# Patient Record
Sex: FEMALE | Race: OTHER | NOT HISPANIC OR LATINO
[De-identification: names, ages, dates, MRNs, and addresses within clinical notes are randomized per-mention and may not be internally consistent; named-entity substitution may affect disease eponyms.]

---

## 2017-03-06 ENCOUNTER — TRANSCRIPTION ENCOUNTER (OUTPATIENT)
Age: 22
End: 2017-03-06

## 2018-10-11 NOTE — CONSULT NOTE ADULT - SUBJECTIVE AND OBJECTIVE BOX
HPI: Patient is a 24 y/o F who presents c/o R foot and ankle pain and swelling s/p stepping into pothole and falling down yesterday.     PAST MEDICAL & SURGICAL HISTORY:  Pyelonephritis  Urinary tract infection  No significant past surgical history    Review of systems: Non Contributory    MEDICATIONS  (STANDING):    Allergies: No known Allergies    Vital Signs Last 24 Hrs  T(C): 36.8 (18 Oct 2018 17:36), Max: 36.8 (18 Oct 2018 17:36)  T(F): 98.2 (18 Oct 2018 17:36), Max: 98.2 (18 Oct 2018 17:36)  HR: 79 (18 Oct 2018 17:36) (79 - 79)  BP: 117/76 (18 Oct 2018 17:36) (117/76 - 117/76)  BP(mean): --  RR: 18 (18 Oct 2018 17:36) (18 - 18)  SpO2: 99% (18 Oct 2018 17:36) (99% - 99%)    Physical Examination:    Musculoskeletal:  Physical examination of right foot and ankle shows pain to palpation of the base of 5th metatarsal area. There is moderate swelling, ecchymosis and decreased in range of motion noted.     Neurovascularly Intact    RADIOLOGY & ADDITIONAL STUDIES: X-ray of right foot and ankle done in the ER shows a mildly displaced 5th metatarsal base fracture    ASSESSMENT: Right 5th metatarsal base fracture, s/p fall in Pothole.     PLAN/RECOMMENDATION: Closed treatment of fracture and application of short leg walking cast.  Patient tolerated well.     Rest. Elevation. Take anti-inflammatory medication.     FOLLOW UP: With office in 1-2 weeks.

## 2018-10-18 ENCOUNTER — EMERGENCY (EMERGENCY)
Facility: HOSPITAL | Age: 23
LOS: 1 days | Discharge: HOME | End: 2018-10-18
Attending: EMERGENCY MEDICINE
Payer: SELF-PAY

## 2018-10-18 VITALS
SYSTOLIC BLOOD PRESSURE: 117 MMHG | TEMPERATURE: 98 F | RESPIRATION RATE: 18 BRPM | OXYGEN SATURATION: 99 % | HEART RATE: 79 BPM | DIASTOLIC BLOOD PRESSURE: 76 MMHG

## 2018-10-18 VITALS — HEIGHT: 61 IN | WEIGHT: 136.03 LBS

## 2018-10-18 PROCEDURE — 29425 APPL SHORT LEG CAST WALKING: CPT | Mod: RT

## 2018-10-18 PROCEDURE — 99284 EMERGENCY DEPT VISIT MOD MDM: CPT | Mod: 25

## 2018-10-18 PROCEDURE — 99284 EMERGENCY DEPT VISIT MOD MDM: CPT

## 2018-10-18 PROCEDURE — 73630 X-RAY EXAM OF FOOT: CPT | Mod: 26,RT

## 2018-10-18 PROCEDURE — 73630 X-RAY EXAM OF FOOT: CPT

## 2018-10-18 NOTE — ED ADULT NURSE NOTE - OBJECTIVE STATEMENT
AOX3 difficulty ambulating on the right foot. As per patient she fell on a pothole x last night. Denies any LOC

## 2018-10-18 NOTE — ED PROVIDER NOTE - MEDICAL DECISION MAKING DETAILS
Pt w/ R foot pain and swelling s/p fall. No deformity. Will get X-ray and give pain control and reassess.

## 2018-10-18 NOTE — ED PROVIDER NOTE - PHYSICAL EXAMINATION
MSK: swelling to the lateral aspect of the R foot, some discoloration. No ankle tenderness. Otherwise wnl.

## 2018-10-18 NOTE — ED PROVIDER NOTE - OBJECTIVE STATEMENT
22 y/o F pt w/ hx of pyelonephritis, UTI presents c/o R foot pain and swelling s/p stepping into pothole and falling down x 10 PM yesterday. Pt relates worsening pain. Denies LOC, head trauma and any other complaints. NKDA.

## 2020-07-28 ENCOUNTER — RESULT REVIEW (OUTPATIENT)
Age: 25
End: 2020-07-28

## 2020-07-28 PROBLEM — Z00.00 ENCOUNTER FOR PREVENTIVE HEALTH EXAMINATION: Status: ACTIVE | Noted: 2020-07-28

## 2020-07-29 ENCOUNTER — NON-APPOINTMENT (OUTPATIENT)
Age: 25
End: 2020-07-29

## 2020-07-29 ENCOUNTER — APPOINTMENT (OUTPATIENT)
Dept: OBGYN | Facility: CLINIC | Age: 25
End: 2020-07-29
Payer: MEDICAID

## 2020-07-29 ENCOUNTER — OUTPATIENT (OUTPATIENT)
Dept: OUTPATIENT SERVICES | Facility: HOSPITAL | Age: 25
LOS: 1 days | End: 2020-07-29
Payer: MEDICAID

## 2020-07-29 VITALS — TEMPERATURE: 99.3 F | OXYGEN SATURATION: 98 % | RESPIRATION RATE: 16 BRPM

## 2020-07-29 VITALS
BODY MASS INDEX: 28.7 KG/M2 | SYSTOLIC BLOOD PRESSURE: 126 MMHG | HEIGHT: 61.5 IN | WEIGHT: 154 LBS | DIASTOLIC BLOOD PRESSURE: 72 MMHG

## 2020-07-29 DIAGNOSIS — Z78.9 OTHER SPECIFIED HEALTH STATUS: ICD-10-CM

## 2020-07-29 DIAGNOSIS — Z34.01 ENCOUNTER FOR SUPERVISION OF NORMAL FIRST PREGNANCY, FIRST TRIMESTER: ICD-10-CM

## 2020-07-29 DIAGNOSIS — Z86.59 PERSONAL HISTORY OF OTHER MENTAL AND BEHAVIORAL DISORDERS: ICD-10-CM

## 2020-07-29 DIAGNOSIS — Z34.00 ENCOUNTER FOR SUPERVISION OF NORMAL FIRST PREGNANCY, UNSPECIFIED TRIMESTER: ICD-10-CM

## 2020-07-29 DIAGNOSIS — Z82.49 FAMILY HISTORY OF ISCHEMIC HEART DISEASE AND OTHER DISEASES OF THE CIRCULATORY SYSTEM: ICD-10-CM

## 2020-07-29 DIAGNOSIS — Z80.0 FAMILY HISTORY OF MALIGNANT NEOPLASM OF DIGESTIVE ORGANS: ICD-10-CM

## 2020-07-29 DIAGNOSIS — Z83.79 FAMILY HISTORY OF OTHER DISEASES OF THE DIGESTIVE SYSTEM: ICD-10-CM

## 2020-07-29 DIAGNOSIS — Z80.3 FAMILY HISTORY OF MALIGNANT NEOPLASM OF BREAST: ICD-10-CM

## 2020-07-29 DIAGNOSIS — Z87.891 PERSONAL HISTORY OF NICOTINE DEPENDENCE: ICD-10-CM

## 2020-07-29 DIAGNOSIS — Z3A.01 LESS THAN 8 WEEKS GESTATION OF PREGNANCY: ICD-10-CM

## 2020-07-29 PROCEDURE — 83020 HEMOGLOBIN ELECTROPHORESIS: CPT | Mod: 26

## 2020-07-29 PROCEDURE — 36415 COLL VENOUS BLD VENIPUNCTURE: CPT | Mod: NC

## 2020-07-29 PROCEDURE — 88141 CYTOPATH C/V INTERPRET: CPT

## 2020-07-29 PROCEDURE — 99203 OFFICE O/P NEW LOW 30 MIN: CPT | Mod: NC,25

## 2020-07-30 DIAGNOSIS — Z86.59 PERSONAL HISTORY OF OTHER MENTAL AND BEHAVIORAL DISORDERS: ICD-10-CM

## 2020-07-30 DIAGNOSIS — Z34.01 ENCOUNTER FOR SUPERVISION OF NORMAL FIRST PREGNANCY, FIRST TRIMESTER: ICD-10-CM

## 2020-07-30 DIAGNOSIS — Z3A.01 LESS THAN 8 WEEKS GESTATION OF PREGNANCY: ICD-10-CM

## 2020-07-30 DIAGNOSIS — N89.8 OTHER SPECIFIED NONINFLAMMATORY DISORDERS OF VAGINA: ICD-10-CM

## 2020-07-30 LAB
A1C WITH ESTIMATED AVERAGE GLUCOSE RESULT: 5 % — SIGNIFICANT CHANGE UP (ref 4–5.6)
APPEARANCE UR: CLEAR — SIGNIFICANT CHANGE UP
BACTERIA # UR AUTO: NEGATIVE — SIGNIFICANT CHANGE UP
BASOPHILS # BLD AUTO: 0.04 K/UL — SIGNIFICANT CHANGE UP (ref 0–0.2)
BASOPHILS NFR BLD AUTO: 0.4 % — SIGNIFICANT CHANGE UP (ref 0–2)
BILIRUB UR-MCNC: NEGATIVE — SIGNIFICANT CHANGE UP
C TRACH RRNA SPEC QL NAA+PROBE: SIGNIFICANT CHANGE UP
C TRACH+GC RRNA SPEC QL PROBE: SIGNIFICANT CHANGE UP
CANDIDA AB TITR SER: SIGNIFICANT CHANGE UP
COLOR SPEC: SIGNIFICANT CHANGE UP
DIFF PNL FLD: NEGATIVE — SIGNIFICANT CHANGE UP
EOSINOPHIL # BLD AUTO: 0.08 K/UL — SIGNIFICANT CHANGE UP (ref 0–0.5)
EOSINOPHIL NFR BLD AUTO: 0.8 % — SIGNIFICANT CHANGE UP (ref 0–6)
EPI CELLS # UR: 6 /HPF — HIGH (ref 0–5)
ESTIMATED AVERAGE GLUCOSE: 97 MG/DL — SIGNIFICANT CHANGE UP (ref 68–114)
G VAGINALIS DNA SPEC QL NAA+PROBE: DETECTED
GLUCOSE UR QL: NEGATIVE — SIGNIFICANT CHANGE UP
HBV SURFACE AG SER-ACNC: SIGNIFICANT CHANGE UP
HBV SURFACE AG SER-ACNC: SIGNIFICANT CHANGE UP
HCT VFR BLD CALC: 42.2 % — SIGNIFICANT CHANGE UP (ref 34.5–45)
HCV AB S/CO SERPL IA: 0.12 S/CO — SIGNIFICANT CHANGE UP (ref 0–0.99)
HCV AB SERPL-IMP: SIGNIFICANT CHANGE UP
HGB BLD-MCNC: 13 G/DL — SIGNIFICANT CHANGE UP (ref 11.5–15.5)
HIV 1+2 AB+HIV1 P24 AG SERPL QL IA: SIGNIFICANT CHANGE UP
HPV HIGH+LOW RISK DNA PNL CVX: DETECTED
HYALINE CASTS # UR AUTO: 3 /LPF — SIGNIFICANT CHANGE UP (ref 0–7)
IMM GRANULOCYTES NFR BLD AUTO: 0.3 % — SIGNIFICANT CHANGE UP (ref 0–1.5)
KETONES UR-MCNC: NEGATIVE — SIGNIFICANT CHANGE UP
LEUKOCYTE ESTERASE UR-ACNC: ABNORMAL
LYMPHOCYTES # BLD AUTO: 2.07 K/UL — SIGNIFICANT CHANGE UP (ref 1–3.3)
LYMPHOCYTES # BLD AUTO: 21.6 % — SIGNIFICANT CHANGE UP (ref 13–44)
MCHC RBC-ENTMCNC: 27.8 PG — SIGNIFICANT CHANGE UP (ref 27–34)
MCHC RBC-ENTMCNC: 30.8 GM/DL — LOW (ref 32–36)
MCV RBC AUTO: 90.2 FL — SIGNIFICANT CHANGE UP (ref 80–100)
MEV IGG SER-ACNC: >300 AU/ML — SIGNIFICANT CHANGE UP
MEV IGG+IGM SER-IMP: POSITIVE — SIGNIFICANT CHANGE UP
MONOCYTES # BLD AUTO: 0.79 K/UL — SIGNIFICANT CHANGE UP (ref 0–0.9)
MONOCYTES NFR BLD AUTO: 8.2 % — SIGNIFICANT CHANGE UP (ref 2–14)
N GONORRHOEA RRNA SPEC QL NAA+PROBE: SIGNIFICANT CHANGE UP
NEUTROPHILS # BLD AUTO: 6.59 K/UL — SIGNIFICANT CHANGE UP (ref 1.8–7.4)
NEUTROPHILS NFR BLD AUTO: 68.7 % — SIGNIFICANT CHANGE UP (ref 43–77)
NITRITE UR-MCNC: NEGATIVE — SIGNIFICANT CHANGE UP
PH UR: 7.5 — SIGNIFICANT CHANGE UP (ref 5–8)
PLATELET # BLD AUTO: 237 K/UL — SIGNIFICANT CHANGE UP (ref 150–400)
PROT UR-MCNC: NEGATIVE — SIGNIFICANT CHANGE UP
RBC # BLD: 4.68 M/UL — SIGNIFICANT CHANGE UP (ref 3.8–5.2)
RBC # FLD: 13.4 % — SIGNIFICANT CHANGE UP (ref 10.3–14.5)
RBC CASTS # UR COMP ASSIST: 1 /HPF — SIGNIFICANT CHANGE UP (ref 0–4)
RUBV IGG SER-ACNC: 8.7 INDEX — SIGNIFICANT CHANGE UP
RUBV IGG SER-IMP: POSITIVE — SIGNIFICANT CHANGE UP
SP GR SPEC: 1.01 — SIGNIFICANT CHANGE UP (ref 1.01–1.02)
T PALLIDUM AB TITR SER: NEGATIVE — SIGNIFICANT CHANGE UP
T VAGINALIS SPEC QL WET PREP: SIGNIFICANT CHANGE UP
UROBILINOGEN FLD QL: SIGNIFICANT CHANGE UP
VZV IGG FLD QL IA: 2268 INDEX — SIGNIFICANT CHANGE UP
VZV IGG FLD QL IA: POSITIVE — SIGNIFICANT CHANGE UP
WBC # BLD: 9.6 K/UL — SIGNIFICANT CHANGE UP (ref 3.8–10.5)
WBC # FLD AUTO: 9.6 K/UL — SIGNIFICANT CHANGE UP (ref 3.8–10.5)
WBC UR QL: 1 /HPF — SIGNIFICANT CHANGE UP (ref 0–5)

## 2020-07-30 PROCEDURE — 87491 CHLMYD TRACH DNA AMP PROBE: CPT

## 2020-07-30 PROCEDURE — 86901 BLOOD TYPING SEROLOGIC RH(D): CPT

## 2020-07-30 PROCEDURE — 87591 N.GONORRHOEAE DNA AMP PROB: CPT

## 2020-07-30 PROCEDURE — 83036 HEMOGLOBIN GLYCOSYLATED A1C: CPT

## 2020-07-30 PROCEDURE — 81220 CFTR GENE COM VARIANTS: CPT

## 2020-07-30 PROCEDURE — 87186 SC STD MICRODIL/AGAR DIL: CPT

## 2020-07-30 PROCEDURE — 83655 ASSAY OF LEAD: CPT

## 2020-07-30 PROCEDURE — 87340 HEPATITIS B SURFACE AG IA: CPT

## 2020-07-30 PROCEDURE — 87086 URINE CULTURE/COLONY COUNT: CPT

## 2020-07-30 PROCEDURE — 81001 URINALYSIS AUTO W/SCOPE: CPT

## 2020-07-30 PROCEDURE — 86780 TREPONEMA PALLIDUM: CPT

## 2020-07-30 PROCEDURE — 86850 RBC ANTIBODY SCREEN: CPT

## 2020-07-30 PROCEDURE — G0463: CPT

## 2020-07-30 PROCEDURE — 86900 BLOOD TYPING SEROLOGIC ABO: CPT

## 2020-07-30 PROCEDURE — 87624 HPV HI-RISK TYP POOLED RSLT: CPT

## 2020-07-30 PROCEDURE — 87389 HIV-1 AG W/HIV-1&-2 AB AG IA: CPT

## 2020-07-30 PROCEDURE — 36415 COLL VENOUS BLD VENIPUNCTURE: CPT

## 2020-07-30 PROCEDURE — 86803 HEPATITIS C AB TEST: CPT

## 2020-07-30 PROCEDURE — 86762 RUBELLA ANTIBODY: CPT

## 2020-07-30 PROCEDURE — 87800 DETECT AGNT MULT DNA DIREC: CPT

## 2020-07-30 PROCEDURE — 86480 TB TEST CELL IMMUN MEASURE: CPT

## 2020-07-30 PROCEDURE — 81003 URINALYSIS AUTO W/O SCOPE: CPT

## 2020-07-30 PROCEDURE — 88175 CYTOPATH C/V AUTO FLUID REDO: CPT

## 2020-07-30 PROCEDURE — 86787 VARICELLA-ZOSTER ANTIBODY: CPT

## 2020-07-30 PROCEDURE — 86765 RUBEOLA ANTIBODY: CPT

## 2020-07-30 PROCEDURE — 83020 HEMOGLOBIN ELECTROPHORESIS: CPT

## 2020-07-30 PROCEDURE — 87625 HPV TYPES 16 & 18 ONLY: CPT

## 2020-07-30 PROCEDURE — 81025 URINE PREGNANCY TEST: CPT

## 2020-07-31 DIAGNOSIS — N89.8 OTHER SPECIFIED NONINFLAMMATORY DISORDERS OF VAGINA: ICD-10-CM

## 2020-07-31 LAB
C TRACH RRNA SPEC QL NAA+PROBE: SIGNIFICANT CHANGE UP
GAMMA INTERFERON BACKGROUND BLD IA-ACNC: 0.02 IU/ML — SIGNIFICANT CHANGE UP
HEMOGLOBIN INTERPRETATION: SIGNIFICANT CHANGE UP
HGB A MFR BLD: 97.2 % — SIGNIFICANT CHANGE UP (ref 95.8–98)
HGB A2 MFR BLD: 2.8 % — SIGNIFICANT CHANGE UP (ref 2–3.2)
M TB IFN-G BLD-IMP: NEGATIVE — SIGNIFICANT CHANGE UP
M TB IFN-G CD4+ BCKGRND COR BLD-ACNC: 0 IU/ML — SIGNIFICANT CHANGE UP
M TB IFN-G CD4+CD8+ BCKGRND COR BLD-ACNC: 0 IU/ML — SIGNIFICANT CHANGE UP
N GONORRHOEA RRNA SPEC QL NAA+PROBE: SIGNIFICANT CHANGE UP
QUANT TB PLUS MITOGEN MINUS NIL: 9.2 IU/ML — SIGNIFICANT CHANGE UP
SPECIMEN SOURCE: SIGNIFICANT CHANGE UP

## 2020-07-31 RX ORDER — PRENATAL VIT NO.130/IRON/FOLIC 27MG-0.8MG
28-0.8 TABLET ORAL
Qty: 30 | Refills: 11 | Status: ACTIVE | COMMUNITY
Start: 2020-07-31 | End: 1900-01-01

## 2020-07-31 RX ORDER — PRENATAL VIT 49/IRON FUM/FOLIC 6.75-0.2MG
TABLET ORAL
Refills: 0 | Status: COMPLETED | COMMUNITY
End: 2020-07-31

## 2020-08-01 LAB
-  AMIKACIN: SIGNIFICANT CHANGE UP
-  AMOXICILLIN/CLAVULANIC ACID: SIGNIFICANT CHANGE UP
-  AMPICILLIN/SULBACTAM: SIGNIFICANT CHANGE UP
-  AMPICILLIN: SIGNIFICANT CHANGE UP
-  AZTREONAM: SIGNIFICANT CHANGE UP
-  CEFAZOLIN: SIGNIFICANT CHANGE UP
-  CEFEPIME: SIGNIFICANT CHANGE UP
-  CEFOXITIN: SIGNIFICANT CHANGE UP
-  CEFTRIAXONE: SIGNIFICANT CHANGE UP
-  CIPROFLOXACIN: SIGNIFICANT CHANGE UP
-  ERTAPENEM: SIGNIFICANT CHANGE UP
-  GENTAMICIN: SIGNIFICANT CHANGE UP
-  IMIPENEM: SIGNIFICANT CHANGE UP
-  LEVOFLOXACIN: SIGNIFICANT CHANGE UP
-  MEROPENEM: SIGNIFICANT CHANGE UP
-  NITROFURANTOIN: SIGNIFICANT CHANGE UP
-  PIPERACILLIN/TAZOBACTAM: SIGNIFICANT CHANGE UP
-  TIGECYCLINE: SIGNIFICANT CHANGE UP
-  TOBRAMYCIN: SIGNIFICANT CHANGE UP
-  TRIMETHOPRIM/SULFAMETHOXAZOLE: SIGNIFICANT CHANGE UP
CULTURE RESULTS: SIGNIFICANT CHANGE UP
HPV GENOTYPE 16: SIGNIFICANT CHANGE UP
HPV GENOTYPE 18/45: SIGNIFICANT CHANGE UP
LEAD BLD-MCNC: SIGNIFICANT CHANGE UP UG/DL (ref 0–4)
METHOD TYPE: SIGNIFICANT CHANGE UP
ORGANISM # SPEC MICROSCOPIC CNT: SIGNIFICANT CHANGE UP
ORGANISM # SPEC MICROSCOPIC CNT: SIGNIFICANT CHANGE UP
SPECIMEN SOURCE: SIGNIFICANT CHANGE UP

## 2020-08-03 DIAGNOSIS — N76.0 ACUTE VAGINITIS: ICD-10-CM

## 2020-08-03 DIAGNOSIS — N39.0 URINARY TRACT INFECTION, SITE NOT SPECIFIED: ICD-10-CM

## 2020-08-03 DIAGNOSIS — B96.89 ACUTE VAGINITIS: ICD-10-CM

## 2020-08-03 DIAGNOSIS — A49.9 URINARY TRACT INFECTION, SITE NOT SPECIFIED: ICD-10-CM

## 2020-08-03 RX ORDER — NITROFURANTOIN MACROCRYSTALS 100 MG/1
100 CAPSULE ORAL
Qty: 14 | Refills: 0 | Status: ACTIVE | COMMUNITY
Start: 2020-08-03 | End: 1900-01-01

## 2020-08-03 RX ORDER — METRONIDAZOLE 500 MG/1
500 TABLET ORAL TWICE DAILY
Qty: 14 | Refills: 0 | Status: ACTIVE | COMMUNITY
Start: 2020-08-03 | End: 1900-01-01

## 2020-08-04 LAB — CYTOLOGY SPEC DOC CYTO: SIGNIFICANT CHANGE UP

## 2020-08-06 LAB — CYSTIC FIBROSIS EXPANDED PANEL: SIGNIFICANT CHANGE UP

## 2020-12-23 PROBLEM — N39.0 BACTERIAL UTI: Status: RESOLVED | Noted: 2020-08-03 | Resolved: 2020-12-23

## 2020-12-23 PROBLEM — N76.0 BACTERIAL VAGINOSIS: Status: RESOLVED | Noted: 2020-08-03 | Resolved: 2020-12-23

## 2023-12-28 ENCOUNTER — EMERGENCY (EMERGENCY)
Facility: HOSPITAL | Age: 28
LOS: 1 days | Discharge: HOME | End: 2023-12-28
Attending: EMERGENCY MEDICINE
Payer: MEDICAID

## 2023-12-28 VITALS
WEIGHT: 157.19 LBS | HEART RATE: 63 BPM | TEMPERATURE: 98 F | RESPIRATION RATE: 17 BRPM | SYSTOLIC BLOOD PRESSURE: 127 MMHG | OXYGEN SATURATION: 98 % | HEIGHT: 61 IN | DIASTOLIC BLOOD PRESSURE: 84 MMHG

## 2023-12-28 PROCEDURE — 70486 CT MAXILLOFACIAL W/O DYE: CPT | Mod: MA

## 2023-12-28 PROCEDURE — 99284 EMERGENCY DEPT VISIT MOD MDM: CPT | Mod: 25

## 2023-12-28 PROCEDURE — 70486 CT MAXILLOFACIAL W/O DYE: CPT | Mod: 26,MA

## 2023-12-28 PROCEDURE — 99284 EMERGENCY DEPT VISIT MOD MDM: CPT

## 2023-12-28 RX ORDER — IBUPROFEN 200 MG
600 TABLET ORAL ONCE
Refills: 0 | Status: COMPLETED | OUTPATIENT
Start: 2023-12-28 | End: 2023-12-28

## 2023-12-28 RX ADMIN — Medication 600 MILLIGRAM(S): at 17:18

## 2023-12-28 RX ADMIN — Medication 600 MILLIGRAM(S): at 16:48

## 2023-12-28 NOTE — ED PROVIDER NOTE - OBJECTIVE STATEMENT
28 year old female with no PMHx presents to the ED with complaints of 3 days of left eye swelling and pain. Patient thought she had a stye and was using a warm compress. Reports the swelling progressed to redness of the eye. Patient states she went to urgent care and was given Augmenting, natural tears, and probiotics. Reports no resolution of symptoms and she has not finished her antibiotics. Also reports the pain has worsened radiating to the left temporal region. Denies fever, nausea, vomiting.

## 2023-12-28 NOTE — ED PROVIDER NOTE - PATIENT PORTAL LINK FT
You can access the FollowMyHealth Patient Portal offered by [facility] by registering at the following website: [email or web address]. By joining [employer]’s FollowMyHealth portal, you will also be able to view your health information using other applications (apps) compatible with our system. You can access the FollowMyHealth Patient Portal offered by [facility] by registering at the following website: [email or web address]. By joining [employer]’s FollowMyHealth portal, you will also be able to view your health information using other applications (apps) compatible with our system.

## 2023-12-28 NOTE — ED PROVIDER NOTE - EYES, MLM
Left eye is injected with swelling to the conjunctiva and sclera. Increased lacrimation. Externally marked swelling to upper and lower eyelid, extending to the malar area.

## 2023-12-28 NOTE — ED ADULT NURSE NOTE - NSFALLUNIVINTERV_ED_ALL_ED
Bed/Stretcher in lowest position, wheels locked, appropriate side rails in place/Call bell, personal items and telephone in reach/Instruct patient to call for assistance before getting out of bed/chair/stretcher/Non-slip footwear applied when patient is off stretcher/[place] to call system/Physically safe environment - no spills, clutter or unnecessary equipment/Purposeful proactive rounding/Room/bathroom lighting operational, light cord in reach Bed/Stretcher in lowest position, wheels locked, appropriate side rails in place/Call bell, personal items and telephone in reach/Instruct patient to call for assistance before getting out of bed/chair/stretcher/Non-slip footwear applied when patient is off stretcher/[place] to call system/Physically safe environment - no spills, clutter or unnecessary equipment/Purposeful proactive rounding/Room/bathroom lighting operational, light cord in reach

## 2023-12-28 NOTE — ED PROVIDER NOTE - CLINICAL SUMMARY MEDICAL DECISION MAKING FREE TEXT BOX
Will get maxillofacial CT to rule out periorbital cellulitis, IV pain medication and opthalmology follow up.

## 2023-12-28 NOTE — ED ADULT NURSE NOTE - OBJECTIVE STATEMENT
Patient c/o worsening Left eye pain & swelling x3 days with excessive watering causing slight blurred vision. Patient denies NVD, fever or dizziness.

## 2024-01-22 ENCOUNTER — EMERGENCY (EMERGENCY)
Facility: HOSPITAL | Age: 29
LOS: 1 days | Discharge: HOME | End: 2024-01-22
Admitting: EMERGENCY MEDICINE
Payer: MEDICAID

## 2024-01-22 VITALS
RESPIRATION RATE: 18 BRPM | SYSTOLIC BLOOD PRESSURE: 121 MMHG | TEMPERATURE: 98 F | HEART RATE: 77 BPM | DIASTOLIC BLOOD PRESSURE: 68 MMHG | OXYGEN SATURATION: 96 % | WEIGHT: 143.08 LBS

## 2024-01-22 DIAGNOSIS — R23.8 OTHER SKIN CHANGES: ICD-10-CM

## 2024-01-22 DIAGNOSIS — H00.015 HORDEOLUM EXTERNUM LEFT LOWER EYELID: ICD-10-CM

## 2024-01-22 PROCEDURE — 99283 EMERGENCY DEPT VISIT LOW MDM: CPT

## 2024-01-22 RX ORDER — ERYTHROMYCIN BASE 5 MG/GRAM
1 OINTMENT (GRAM) OPHTHALMIC (EYE) ONCE
Refills: 0 | Status: COMPLETED | OUTPATIENT
Start: 2024-01-22 | End: 2024-01-22

## 2024-01-22 RX ORDER — ERYTHROMYCIN BASE 5 MG/GRAM
1 OINTMENT (GRAM) OPHTHALMIC (EYE)
Qty: 1 | Refills: 0
Start: 2024-01-22 | End: 2024-01-26

## 2024-01-22 RX ADMIN — Medication 1 APPLICATION(S): at 20:31

## 2024-01-22 RX ADMIN — Medication 1 TABLET(S): at 20:31

## 2024-01-22 NOTE — ED ADULT TRIAGE NOTE - GLASGOW COMA SCALE: BEST VERBAL RESPONSE, MLM
At [facility], one important tool we use to improve our patient services is our Patient Survey.  Following your visit you may receive our survey in the mail.    Please take the time to complete the survey.    If your visit with us was great, we want to hear about it.    If we can improve, please let us know how.          (V5) oriented

## 2024-01-22 NOTE — ED PROVIDER NOTE - OBJECTIVE STATEMENT
29 y/o f presents c/o left lower lid redness and swelling over the past 3 days.  Pt stating she tried warm compresses today.  Denies fever, chills, visual changes, all other ROS negative.

## 2024-01-22 NOTE — ED PROVIDER NOTE - CLINICAL SUMMARY MEDICAL DECISION MAKING FREE TEXT BOX
27 yo f presents with stye to left lower lid.  Pt afebrile in ED, recommend warm compresses and given erythromycin topical.  Pt has hx periorbital cellulitis resultant from a stye so will cover with augmentin given the swelling to lower lid, f/u ophthalmology if sx do not resolve.

## 2024-01-22 NOTE — ED PROVIDER NOTE - NSFOLLOWUPCLINICS_GEN_ALL_ED_FT
[facility] - Ophthalmology Clinic  Ophthalmology  [address]  [place], [place] [address]  Phone: [phone]  Fax:

## 2024-01-22 NOTE — ED PROVIDER NOTE - PATIENT PORTAL LINK FT
You can access the FollowMyHealth Patient Portal offered by [facility] by registering at the following website: [email or web address]. By joining [employer]’s FollowMyHealth portal, you will also be able to view your health information using other applications (apps) compatible with our system.

## 2024-01-22 NOTE — ED ADULT TRIAGE NOTE - CHIEF COMPLAINT QUOTE
pt presents to ER c/o redness, swelling and pain under L eye for the past day. states she was diagnosed with cellulitis to L eye several weeks ago and finished full course of abx. denies fevers.

## 2024-01-22 NOTE — ED ADULT NURSE NOTE - OBJECTIVE STATEMENT
Pt is a 29 yo F here for L eye pain/swelling since yesterday. Reports sx present upon waking up in the morning, redness and swelling noted to bottom L eyelid. Reports similar sx recently, treated for cellulitis with abx. Denies visual changes, f/c.

## 2024-01-22 NOTE — ED PROVIDER NOTE - NSFOLLOWUPINSTRUCTIONS_ED_ALL_ED_FT
Stye  A stye, also known as a hordeolum, is a bump that forms on an eyelid. It may look like a pimple next to the eyelash. A stye can form inside the eyelid (internal stye) or outside the eyelid (external stye). A stye can cause redness, swelling, and pain on the eyelid.    Styes are very common. Anyone can get them at any age. They usually occur in just one eye at a time, but you may have more than one in either eye.    What are the causes?  A stye is caused by an infection. The infection is almost always caused by bacteria called Staphylococcus aureus. This is a common type of bacteria that lives on the skin.    An internal stye may result from an infected oil-producing gland inside the eyelid. An external stye may be caused by an infection at the base of the eyelash (hair follicle).    What increases the risk?  You are more likely to develop a stye if:  You have had a stye before.  You have any of these conditions:  Red, itchy, inflamed eyelids (blepharitis).  A skin condition such as seborrheic dermatitis or rosacea.  High fat levels in your blood (lipids).  Dry eyes.  What are the signs or symptoms?  Two eyes. One eye is normal, and one eye has a stye on the lower eyelid.  The most common symptom of a stye is eyelid pain. Internal styes are more painful than external styes. Other symptoms may include:  Painful swelling of your eyelid.  A scratchy feeling in your eye.  Tearing and redness of your eye.  A pimple-like bump on the edge of the eyelid.  Pus draining from the stye.  How is this diagnosed?  Your health care provider may be able to diagnose a stye just by examining your eye. The health care provider may also check to make sure:  You do not have a fever or other signs of a more serious infection.  The infection has not spread to other parts of your eye or areas around your eye.  How is this treated?  Most styes will clear up in a few days without treatment or with warm compresses applied to the area. You may need to use antibiotic drops or ointment to treat an infection. Sometimes, steroid drops or ointment are used in addition to antibiotics.    In some cases, your health care provider may give you a small steroid injection in the eyelid.    If your stye does not heal with routine treatment, your health care provider may drain pus from the stye using a thin blade or needle. This may be done if the stye is large, causing a lot of pain, or affecting your vision.    Follow these instructions at home:  Take over-the-counter and prescription medicines only as told by your health care provider. This includes eye drops or ointments.  If you were prescribed an antibiotic medicine, steroid medicine, or both, apply or use them as told by your health care provider. Do not stop using the medicine even if your condition improves.  Apply a warm, wet cloth (warm compress) to your eye for 5–10 minutes, 4 to 6 times a day.  Clean the affected eyelid as directed by your health care provider.  Do not wear contact lenses or eye makeup until your stye has healed and your health care provider says that it is safe.  Do not try to pop or drain the stye.  Do not rub your eye.  Contact a health care provider if:  You have chills or a fever.  Your stye does not go away after several days.  Your stye affects your vision.  Your eyeball becomes swollen, red, or painful.  Get help right away if:  You have pain when moving your eye around.  Summary  A stye is a bump that forms on an eyelid. It may look like a pimple next to the eyelash.  A stye can form inside the eyelid (internal stye) or outside the eyelid (external stye). A stye can cause redness, swelling, and pain on the eyelid.  Your health care provider may be able to diagnose a stye just by examining your eye.  Apply a warm, wet cloth (warm compress) to your eye for 5–10 minutes, 4 to 6 times a day.  This information is not intended to replace advice given to you by your health care provider. Make sure you discuss any questions you have with your health care provider.

## 2024-02-19 NOTE — ED ADULT NURSE NOTE - NS ED NOTE  TALK SOMEONE YN
[facility]: Cancer Care                                                                                          Reviewed ED visit over the weekend and POC. Pt has appropriate follow up scheduled in clinic today for infusion visit.  Also has ANDREAS follow-up on 2/23/24.    Signature:  Arely Krueger RN  
No

## 2024-02-20 ENCOUNTER — NON-APPOINTMENT (OUTPATIENT)
Age: 29
End: 2024-02-20

## 2024-02-20 ENCOUNTER — APPOINTMENT (OUTPATIENT)
Dept: OPHTHALMOLOGY | Facility: CLINIC | Age: 29
End: 2024-02-20
Payer: MEDICAID

## 2024-02-20 PROCEDURE — 92002 INTRM OPH EXAM NEW PATIENT: CPT

## 2024-02-27 ENCOUNTER — APPOINTMENT (OUTPATIENT)
Dept: OPHTHALMOLOGY | Facility: CLINIC | Age: 29
End: 2024-02-27

## 2024-09-11 NOTE — ED PROVIDER NOTE - ED STEMI HIDDEN
· improving, likely reactive secondary to kidney stone  · No evidence of infection on urinalysis  · Has remained afebrile  · UA with trace blood in 1-2 RBC secondary to kidney stone
hide
Auditory hallucinations

## 2025-04-01 ENCOUNTER — EMERGENCY (EMERGENCY)
Facility: HOSPITAL | Age: 30
LOS: 1 days | Discharge: HOME | End: 2025-04-01
Attending: STUDENT IN AN ORGANIZED HEALTH CARE EDUCATION/TRAINING PROGRAM
Payer: MEDICAID

## 2025-04-01 VITALS
OXYGEN SATURATION: 98 % | WEIGHT: 169.76 LBS | SYSTOLIC BLOOD PRESSURE: 123 MMHG | DIASTOLIC BLOOD PRESSURE: 87 MMHG | TEMPERATURE: 98 F | RESPIRATION RATE: 16 BRPM | HEART RATE: 75 BPM

## 2025-04-01 LAB — HCG UR QL: NEGATIVE — SIGNIFICANT CHANGE UP

## 2025-04-01 PROCEDURE — 10061 I&D ABSCESS COMP/MULTIPLE: CPT

## 2025-04-01 PROCEDURE — 87077 CULTURE AEROBIC IDENTIFY: CPT

## 2025-04-01 PROCEDURE — 10060 I&D ABSCESS SIMPLE/SINGLE: CPT

## 2025-04-01 PROCEDURE — 87186 SC STD MICRODIL/AGAR DIL: CPT

## 2025-04-01 PROCEDURE — 99284 EMERGENCY DEPT VISIT MOD MDM: CPT | Mod: 25

## 2025-04-01 PROCEDURE — 87070 CULTURE OTHR SPECIMN AEROBIC: CPT

## 2025-04-01 PROCEDURE — 81025 URINE PREGNANCY TEST: CPT

## 2025-04-01 PROCEDURE — 99283 EMERGENCY DEPT VISIT LOW MDM: CPT | Mod: 25

## 2025-04-01 RX ORDER — IBUPROFEN 200 MG
600 TABLET ORAL ONCE
Refills: 0 | Status: COMPLETED | OUTPATIENT
Start: 2025-04-01 | End: 2025-04-01

## 2025-04-01 RX ORDER — CLINDAMYCIN PHOSPHATE 150 MG/ML
1 VIAL (ML) INJECTION
Qty: 40 | Refills: 0
Start: 2025-04-01 | End: 2025-04-10

## 2025-04-01 RX ORDER — ACETAMINOPHEN 500 MG/5ML
650 LIQUID (ML) ORAL ONCE
Refills: 0 | Status: COMPLETED | OUTPATIENT
Start: 2025-04-01 | End: 2025-04-01

## 2025-04-01 RX ORDER — CLINDAMYCIN PHOSPHATE 150 MG/ML
300 VIAL (ML) INJECTION ONCE
Refills: 0 | Status: COMPLETED | OUTPATIENT
Start: 2025-04-01 | End: 2025-04-01

## 2025-04-01 RX ORDER — LIDOCAINE HCL/EPINEPHRINE/PF 1 %-1:200K
5 AMPUL (ML) INJECTION ONCE
Refills: 0 | Status: COMPLETED | OUTPATIENT
Start: 2025-04-01 | End: 2025-04-01

## 2025-04-01 RX ADMIN — Medication 650 MILLIGRAM(S): at 23:17

## 2025-04-01 RX ADMIN — Medication 5 MILLILITER(S): at 23:18

## 2025-04-01 RX ADMIN — Medication 300 MILLIGRAM(S): at 23:17

## 2025-04-01 RX ADMIN — Medication 600 MILLIGRAM(S): at 23:17

## 2025-04-01 NOTE — ED PROVIDER NOTE - PHYSICAL EXAMINATION
3 x 4 cm left lower rib induration mid axillary line with central punctum with discharge of purulent drainage

## 2025-04-01 NOTE — ED PROVIDER NOTE - PROGRESS NOTE DETAILS
patient abscess drained noting 2 to 3 cc of purulent drainage wound culture sent patient endorses feeling well otherwise clinically stable given antibiotics return precaution instructed follow-up PMD patient clinically stable neurovascular intact on discharge

## 2025-04-01 NOTE — ED PROVIDER NOTE - OBJECTIVE STATEMENT
29-year-old presenting with 1 week of left lower rib mid axillary line induration and redness noting drainage today patient states that she was seen in urgent care but was not given any medication prior to today's visit denies any fever nausea vomiting IV drug use chest pain shortness of breath

## 2025-04-01 NOTE — ED ADULT NURSE NOTE - NSFALLUNIVINTERV_ED_ALL_ED
Bed/Stretcher in lowest position, wheels locked, appropriate side rails in place/Call bell, personal items and telephone in reach/Instruct patient to call for assistance before getting out of bed/chair/stretcher/Non-slip footwear applied when patient is off stretcher/[place] to call system/Physically safe environment - no spills, clutter or unnecessary equipment/Purposeful proactive rounding/Room/bathroom lighting operational, light cord in reach

## 2025-04-01 NOTE — ED PROVIDER NOTE - CLINICAL SUMMARY MEDICAL DECISION MAKING FREE TEXT BOX
patient presenting with left chest wall induration consistent with abscess otherwise vital stable well-appearing will  drain abscess will give antibiotics return precaution and PMD follow-up instructions

## 2025-04-01 NOTE — ED PROVIDER NOTE - PATIENT PORTAL LINK FT
You can access the FollowMyHealth Patient Portal offered by [facility] by registering at the following website: [email or web address]. By joining [employer]’s FollowMyHealth portal, you will also be able to view your health information using other applications (apps) compatible with our system.

## 2025-04-04 LAB
-  CLINDAMYCIN: SIGNIFICANT CHANGE UP
-  GENTAMICIN: SIGNIFICANT CHANGE UP
-  OXACILLIN: SIGNIFICANT CHANGE UP
-  PENICILLIN: SIGNIFICANT CHANGE UP
-  TETRACYCLINE: SIGNIFICANT CHANGE UP
-  TRIMETHOPRIM/SULFAMETHOXAZOLE: SIGNIFICANT CHANGE UP
-  VANCOMYCIN: SIGNIFICANT CHANGE UP
METHOD TYPE: SIGNIFICANT CHANGE UP

## 2025-04-07 LAB
CULTURE RESULTS: ABNORMAL
ORGANISM # SPEC MICROSCOPIC CNT: ABNORMAL
ORGANISM # SPEC MICROSCOPIC CNT: ABNORMAL
SPECIMEN SOURCE: SIGNIFICANT CHANGE UP